# Patient Record
Sex: MALE | Race: WHITE | NOT HISPANIC OR LATINO | Employment: FULL TIME | URBAN - METROPOLITAN AREA
[De-identification: names, ages, dates, MRNs, and addresses within clinical notes are randomized per-mention and may not be internally consistent; named-entity substitution may affect disease eponyms.]

---

## 2024-01-23 ENCOUNTER — HOSPITAL ENCOUNTER (OUTPATIENT)
Dept: SLEEP MEDICINE | Facility: HOSPITAL | Age: 29
Discharge: HOME | End: 2024-01-23
Admitting: INTERNAL MEDICINE
Payer: COMMERCIAL

## 2024-01-23 DIAGNOSIS — G47.33 OSA (OBSTRUCTIVE SLEEP APNEA): ICD-10-CM

## 2024-01-23 PROCEDURE — 95806 SLEEP STUDY UNATT&RESP EFFT: CPT

## 2024-02-05 DIAGNOSIS — G47.33 OSA (OBSTRUCTIVE SLEEP APNEA): Primary | ICD-10-CM

## 2024-02-06 ENCOUNTER — TELEPHONE (OUTPATIENT)
Dept: SLEEP MEDICINE | Facility: HOSPITAL | Age: 29
End: 2024-02-06
Payer: COMMERCIAL

## 2024-02-06 NOTE — TELEPHONE ENCOUNTER
Lm with results on pts vm. Faxed order to [facility] for set up. Pt will need to schedule a f/u for compliance.

## 2024-02-07 ENCOUNTER — TELEPHONE (OUTPATIENT)
Dept: SLEEP MEDICINE | Facility: HOSPITAL | Age: 29
End: 2024-02-07
Payer: COMMERCIAL

## 2024-02-07 NOTE — TELEPHONE ENCOUNTER
Received a phone call from [id number]. Pt has not been seen within 6mo and will need an appt if he wants to get set up on cpap.

## 2024-02-29 ENCOUNTER — OFFICE VISIT (OUTPATIENT)
Dept: FAMILY MEDICINE CLINIC | Facility: CLINIC | Age: 29
End: 2024-02-29
Payer: COMMERCIAL

## 2024-02-29 VITALS
SYSTOLIC BLOOD PRESSURE: 132 MMHG | BODY MASS INDEX: 30.28 KG/M2 | RESPIRATION RATE: 14 BRPM | WEIGHT: 243.5 LBS | HEART RATE: 90 BPM | OXYGEN SATURATION: 100 % | TEMPERATURE: 97.5 F | DIASTOLIC BLOOD PRESSURE: 80 MMHG | HEIGHT: 75 IN

## 2024-02-29 DIAGNOSIS — R61 HYPERHIDROSIS: Primary | Chronic | ICD-10-CM

## 2024-02-29 DIAGNOSIS — G47.33 OSA (OBSTRUCTIVE SLEEP APNEA): Chronic | ICD-10-CM

## 2024-02-29 DIAGNOSIS — R74.8 ELEVATED LIVER ENZYMES: Chronic | ICD-10-CM

## 2024-02-29 DIAGNOSIS — Z00.00 ROUTINE GENERAL MEDICAL EXAMINATION AT A HEALTH CARE FACILITY: ICD-10-CM

## 2024-02-29 DIAGNOSIS — E78.2 MIXED HYPERLIPIDEMIA: Chronic | ICD-10-CM

## 2024-02-29 RX ORDER — OXYBUTYNIN CHLORIDE 10 MG/1
10 TABLET, EXTENDED RELEASE ORAL DAILY
Qty: 30 TABLET | Refills: 1 | Status: SHIPPED | OUTPATIENT
Start: 2024-02-29

## 2024-02-29 RX ORDER — OXYBUTYNIN CHLORIDE 5 MG/1
5 TABLET, EXTENDED RELEASE ORAL DAILY
Qty: 30 TABLET | Refills: 1 | Status: CANCELLED | OUTPATIENT
Start: 2024-02-29

## 2024-02-29 NOTE — PROGRESS NOTES
"Chief Complaint  Follow-up    Subjective        Edouard Schneider presents to [facility]  History of Present Illness patient here for follow-up on hyperhidrosis.  Seen last summer as new patient appointment.  He had lost insurance and was not able to continue his oxybutynin after first month but did find it helpful.  He would like to continue it but would like to see if he can go up to 10 mg.  He noted that he did not begin sweating as soon as he normally would but once he started sweating it was about the same amount.  He sweats so profusely that he soaks even his jackets.  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  He denies any side effects on oxybutynin.    He had his sleep study which shows very severe sleep apnea and will get CPAP upcoming.  When he saw the results of sleep study he is encouraged that CPAP should make a significant improvement in his health.    Since last visit when labs came back showing mildly elevated liver enzymes and hyperlipidemia he has really worked to improve his diet.  He has cut back on soft drinks and rarely drinks them and limits alcohol to 5 beers or so every couple weeks with friends.  He has cleaned up his diet.  For lunch has been eating chicken wraps, broccoli, grapes and has lost approximately 11 pounds since last visit.  He has new [employer] and been walking quite a bit with dog.  He has also joined gym with buddies.    He has started working as a  for [employer] where he went to school.  He really enjoys his job.    Objective   Vital Signs:  /80   Pulse 90   Temp 97.5 °F (36.4 °C) (Temporal)   Resp 14   Ht 189.2 cm (74.5\")   Wt 110 kg (243 lb 8 oz)   SpO2 100%   BMI 30.85 kg/m²   Estimated body mass index is 30.85 kg/m² as calculated from the following:    Height as of this encounter: 189.2 cm (74.5\").    Weight as of this encounter: 110 kg (243 lb 8 oz).               Physical Exam  Vitals and nursing " note reviewed.   Constitutional:       General: He is not in acute distress.     Appearance: He is well-developed. He is not ill-appearing or diaphoretic.   HENT:      Head: Normocephalic and atraumatic.      Right Ear: Ear canal and external ear normal. Tympanic membrane is scarred.      Left Ear: Ear canal and external ear normal. Tympanic membrane is scarred.      Mouth/Throat:      Mouth: Mucous membranes are moist.      Pharynx: No posterior oropharyngeal erythema.   Eyes:      General: No scleral icterus.        Right eye: No discharge.         Left eye: No discharge.      Conjunctiva/sclera: Conjunctivae normal.   Neck:      Thyroid: No thyromegaly.   Cardiovascular:      Rate and Rhythm: Normal rate and regular rhythm.      Heart sounds: Normal heart sounds.   Pulmonary:      Effort: Pulmonary effort is normal.      Breath sounds: Normal breath sounds.   Abdominal:      General: Bowel sounds are normal. There is no distension.      Palpations: Abdomen is soft. There is no hepatomegaly or mass.      Tenderness: There is no abdominal tenderness.   Musculoskeletal:         General: No deformity.      Cervical back: Neck supple.      Comments: Gait smooth and steady   Lymphadenopathy:      Cervical: No cervical adenopathy.   Skin:     General: Skin is warm and dry.   Neurological:      General: No focal deficit present.      Mental Status: He is alert and oriented to person, place, and time.   Psychiatric:         Mood and Affect: Mood normal.         Behavior: Behavior normal.         Thought Content: Thought content normal.      Comments: Very pleasant and conversant, very engaged in health maintenance        Result Review :                     Assessment and Plan     Diagnoses and all orders for this visit:    1. Hyperhidrosis (Primary)  -     oxybutynin XL (Ditropan XL) 10 MG 24 hr tablet; Take 1 tablet by mouth Daily.  Dispense: 30 tablet; Refill: 1    2. RICHMOND (obstructive sleep apnea)    3. Elevated liver  enzymes    4. Mixed hyperlipidemia    5. Routine general medical examination at a health care facility  -     CBC & Differential; Future  -     Comprehensive Metabolic Panel; Future  -     Hemoglobin A1c; Future  -     Lipid Panel With LDL / HDL Ratio; Future  -     TSH Rfx On Abnormal To Free T4; Future    Hyperhidrosis: Improved with 5 mg but not really controlled so we will increase to 10 mg of oxybutynin.  Again discussed side effects.  If tolerating well and helpful after 1 month he can let me know and I will give him 90-day prescriptions.    Reviewed sleep study and discussed with patient.  Has upcoming appointment with the sleep medicine for CPAP which I think will be very helpful for him.    We discussed previously elevated liver enzymes likely due to hyperlipidemia.  I have put labs in for physical which he will get at his convenience prior to physical.  I would expect that they should be improved with continued weight loss and dietary modifications.  If liver enzymes continue to be elevated we will get ultrasound for NAFLD.  Patient agreeable at this time.         Follow Up     No follow-ups on file.  Patient was given instructions and counseling regarding his condition or for health maintenance advice. Please see specific information pulled into the AVS if appropriate.         Answers submitted by the patient for this visit:  Primary Reason for Visit (Submitted on 2/22/2024)  What is the primary reason for your visit?: Other  Other (Submitted on 2/22/2024)  Please describe your symptoms.: Check-Up from July visit. Had to reschedule multiple times due to change in my Insurance.  Have you had these symptoms before?: No  How long have you been having these symptoms?: 1-4 days  Please list any medications you are currently taking for this condition.: None at the moment. Was on medicine for sweating issue but have not been on medication since August due to Insurance.  Please describe any probable cause for  these symptoms. : NA

## 2024-03-04 ENCOUNTER — OFFICE VISIT (OUTPATIENT)
Dept: SLEEP MEDICINE | Facility: HOSPITAL | Age: 29
End: 2024-03-04
Payer: COMMERCIAL

## 2024-03-04 VITALS
OXYGEN SATURATION: 97 % | SYSTOLIC BLOOD PRESSURE: 132 MMHG | DIASTOLIC BLOOD PRESSURE: 69 MMHG | HEART RATE: 84 BPM | WEIGHT: 241 LBS | BODY MASS INDEX: 29.97 KG/M2 | HEIGHT: 75 IN

## 2024-03-04 DIAGNOSIS — G47.33 OSA (OBSTRUCTIVE SLEEP APNEA): Primary | ICD-10-CM

## 2024-03-04 PROCEDURE — G0463 HOSPITAL OUTPT CLINIC VISIT: HCPCS

## 2024-03-04 NOTE — PROGRESS NOTES
[facility]                          Chief Complaint:   F/up RICHMOND    History of present illness:   Subjective     Sun  Patient is a 28 y.o. male patient with no PMH who complains about snoring, apnea, frequent awakening and restless sleep.  BMI = 32.     HST 1/24/24:   HANNA= 53.9 /h; Supine HANNA: 66.3= /h.     MAIRA= 54.4 events/h; Avery SpO2= 79% and hypoxic burden: 65.8 min.       Sleep schedule:  -Bedtime: 11 PM  -Sleep latency: not long  -Wake up time: 5 AM , does not feel refreshed  -Nocturnal awakening: 3-5 times because of changing position and taking his dog out.  No difficulties going back to sleep.  -Perceived sleep hours: 6-7 h    ESS: Total score: 6     REVIEW OF SYSTEMS:   HEENT: No nasal congestion or postnasal drip   CARDIOVASCULAR: No chest pain, chest pressure or chest discomfort. No palpitations or edema.   RESPIRATORY: No shortness of breath, cough or sputum.   GASTROINTESTINAL: No abdominal bloating or reflux   NEUROLOGICAL/PSYCHOATRY: No depression nor anxiety    Past Medical History:  Past Medical History:   Diagnosis Date    Asthma About 2008    Sports Induce Asthma    GERD (gastroesophageal reflux disease)     Infant until 4 years old    History of medical problems 2-3 weeks ago    Severe Sleep Apnea from taking at home test. Scheduled for March 4th for follow-up with doctor to get put on C-Pap   , No past surgical history on file.,   Social History     Socioeconomic History    Marital status:    Tobacco Use    Smoking status: Never     Passive exposure: Never    Smokeless tobacco: Never   Substance and Sexual Activity    Alcohol use: Not Currently     Alcohol/week: 1.0 - 2.0 standard drink of alcohol     Types: 1 - 2 Cans of beer per week     Comment: Social/Light Drinker. Rarely drink.    Drug use: Never    Sexual activity: Yes     Partners: Female     Birth control/protection: Birth control pill     E-cigarette/Vaping     E-cigarette/Vaping Substances  "    E-cigarette/Vaping Devices         , and Allergies:  Patient has no known allergies.    Medication Review:     Current Outpatient Medications:     oxybutynin XL (Ditropan XL) 10 MG 24 hr tablet, Take 1 tablet by mouth Daily., Disp: 30 tablet, Rfl: 1      Objective   Vital Signs:  Vitals:    03/04/24 1419   BP: 132/69   Pulse: 84   SpO2: 97%   Weight: 109 kg (241 lb)   Height: 189.2 cm (74.5\")     Body mass index is 30.53 kg/m².          Physical Exam:   General Appearance:    Alert, cooperative, in no acute distress   ENMT:  Nieves score 3. Mallampati score 3. No oral thrush. Tonsils grade 1. Narrow distance in between the posterior pharyngeal pillars (<25 %).    Neck:  Large. Trachea midline. No thyromegaly.   Lungs:     Clear to auscultation,respirations regular, even and  unlabored    Heart:    Regular rhythm and normal rate, normal S1 and S2, no Murmur.   Abdomen:     Obese.  Soft.  No tenderness.  No HSM    Neuro:   Conscious, alert, oriented x3. Appropriate mood and affect.    Extremities:   Moves all extremities well, no edema, no cyanosis, no Redness              Diagnostic data:      Lab Results   Component Value Date    HGBA1C 5.20 06/23/2023     Triglycerides   Date Value Ref Range Status   06/23/2023 251 (H) 0 - 150 mg/dL Final     HDL Cholesterol   Date Value Ref Range Status   06/23/2023 32 (L) 40 - 60 mg/dL Final     Hemoglobin   Date Value Ref Range Status   06/23/2023 16.9 13.0 - 17.7 g/dL Final     Total CO2   Date Value Ref Range Status   06/23/2023 29.0 22.0 - 29.0 mmol/L Final        Assessment   Severe RICHMOND  Obesity, BMI 30  GERD      PLAN:  Start CPAP. I explained the result of the sleep study.  Counseled for weight loss.  Encouraged to exercise regularly and cut down on carbohydrates.  Discussed that losing weight may decrease the severity of sleep apnea and obviate the need of CPAP therapy.                  This note was dictated utilizing Dragon dictation  "

## 2024-03-05 ENCOUNTER — TELEPHONE (OUTPATIENT)
Dept: SLEEP MEDICINE | Facility: HOSPITAL | Age: 29
End: 2024-03-05
Payer: COMMERCIAL

## 2024-05-09 DIAGNOSIS — R61 HYPERHIDROSIS: Chronic | ICD-10-CM

## 2024-05-09 RX ORDER — OXYBUTYNIN CHLORIDE 10 MG/1
10 TABLET, EXTENDED RELEASE ORAL DAILY
Qty: 30 TABLET | Refills: 1 | Status: SHIPPED | OUTPATIENT
Start: 2024-05-09

## 2024-06-17 ENCOUNTER — OFFICE VISIT (OUTPATIENT)
Dept: SLEEP MEDICINE | Facility: HOSPITAL | Age: 29
End: 2024-06-17
Payer: COMMERCIAL

## 2024-06-17 VITALS
OXYGEN SATURATION: 98 % | WEIGHT: 234 LBS | HEIGHT: 75 IN | SYSTOLIC BLOOD PRESSURE: 114 MMHG | BODY MASS INDEX: 29.09 KG/M2 | HEART RATE: 93 BPM | DIASTOLIC BLOOD PRESSURE: 70 MMHG

## 2024-06-17 PROCEDURE — G0463 HOSPITAL OUTPT CLINIC VISIT: HCPCS

## 2024-06-17 NOTE — PROGRESS NOTES
[facility]                          Chief Complaint:   F/up RICHMOND    History of present illness:   Subjective     Sun  Patient is a 29 y.o. male patient with no PMH who complains about snoring, apnea, frequent awakening and restless sleep.      HST 24:   HANNA= 53.9 /h; Supine HANNA: 66.3= /h.     MAIRA= 54.4 events/h; Avery SpO2= 79% and hypoxic burden: 65.8 min.       Sleep schedule:  -Bedtime: 10 PM  -Sleep latency: not long  -Wake up time: 6 AM , does not feel refreshed  -Nocturnal awakenin-2 times because of taking his dog out.  No difficulties going back to sleep.  -Perceived sleep hours: 8 h    ESS: Total score: 4     Obesity:  Doing more exercise (walking). Lost some weight since last year.     REVIEW OF SYSTEMS:   HEENT: No nasal congestion or postnasal drip   CARDIOVASCULAR: No chest pain, chest pressure or chest discomfort. No palpitations or edema.   RESPIRATORY: No shortness of breath, cough or sputum.   GASTROINTESTINAL: No abdominal bloating or reflux   NEUROLOGICAL/PSYCHOATRY: No depression nor anxiety    Past Medical History:  Past Medical History:   Diagnosis Date    Asthma About     Sports Induce Asthma    GERD (gastroesophageal reflux disease)     Infant until 4 years old    History of medical problems 2-3 weeks ago    Severe Sleep Apnea from taking at home test. Scheduled for  for follow-up with doctor to get put on C-Pap   , No past surgical history on file.,   Social History     Socioeconomic History    Marital status:    Tobacco Use    Smoking status: Never     Passive exposure: Never    Smokeless tobacco: Never   Substance and Sexual Activity    Alcohol use: Not Currently     Alcohol/week: 1.0 - 2.0 standard drink of alcohol     Types: 1 - 2 Cans of beer per week     Comment: Social/Light Drinker. Rarely drink.    Drug use: Never    Sexual activity: Yes     Partners: Female     Birth control/protection: Birth control pill  "    E-cigarette/Vaping     E-cigarette/Vaping Substances     E-cigarette/Vaping Devices         , and Allergies:  Patient has no known allergies.    Medication Review:     Current Outpatient Medications:     oxybutynin XL (DITROPAN-XL) 10 MG 24 hr tablet, TAKE 1 TABLET BY MOUTH EVERY DAY, Disp: 30 tablet, Rfl: 1      Objective   Vital Signs:  Vitals:    06/17/24 0813   BP: 114/70   Pulse: 93   SpO2: 98%   Weight: 106 kg (234 lb)   Height: 189.2 cm (74.5\")     Body mass index is 29.64 kg/m².          Physical Exam:   General Appearance:    Alert, cooperative, in no acute distress   ENMT:  Nieves score 3. Mallampati score 3. No oral thrush. Tonsils grade 1. Narrow distance in between the posterior pharyngeal pillars (<25 %).    Neck:  Large. Trachea midline. No thyromegaly.   Lungs:     Clear to auscultation,respirations regular, even and  unlabored    Heart:    Regular rhythm and normal rate, normal S1 and S2, no Murmur.   Abdomen:     Obese.  Soft.  No tenderness.  No HSM    Neuro:   Conscious, alert, oriented x3. Appropriate mood and affect.    Extremities:   Moves all extremities well, no edema, no cyanosis, no Redness              Diagnostic data:    Download over the last 30 days  Usage: 100%  Usage>4h= 100%  Average: 7 h 56 min  AHI= 1.6  Leak= 10  P 95%= 14.6  CPAP 5-15.     Lab Results   Component Value Date    HGBA1C 5.20 06/23/2023     Triglycerides   Date Value Ref Range Status   06/23/2023 251 (H) 0 - 150 mg/dL Final     HDL Cholesterol   Date Value Ref Range Status   06/23/2023 32 (L) 40 - 60 mg/dL Final     Hemoglobin   Date Value Ref Range Status   06/23/2023 16.9 13.0 - 17.7 g/dL Final     Total CO2   Date Value Ref Range Status   06/23/2023 29.0 22.0 - 29.0 mmol/L Final        Assessment   Severe RICHMOND  Obesity, BMI 30  GERD- improved      PLAN:  Discussed the result of the download.   Patient is compliant with therapy and clinically benefit from treatment.  Patient was encouraged to continue using " PAP.  Refill supplies.   Counseled for weight loss.  Encouraged to exercise regularly and cut down on carbohydrates.  Discussed that losing weight may decrease the severity of sleep apnea and obviate the need of CPAP therapy.  RTC 12 months.                   This note was dictated utilizing Dragon dictation

## 2024-07-05 DIAGNOSIS — R61 HYPERHIDROSIS: Chronic | ICD-10-CM

## 2024-07-05 NOTE — TELEPHONE ENCOUNTER
Rx Refill Note  Requested Prescriptions     Pending Prescriptions Disp Refills    oxybutynin XL (DITROPAN-XL) 10 MG 24 hr tablet [Pharmacy Med Name: OXYBUTYNIN CL ER 10 MG TABLET] 90 tablet 1     Sig: TAKE 1 TABLET BY MOUTH EVERY DAY      Last office visit with prescribing clinician: 2/29/2024   Last telemedicine visit with prescribing clinician: Visit date not found   Next office visit with prescribing clinician: Visit date not found                         Would you like a call back once the refill request has been completed: [] Yes [] No    If the office needs to give you a call back, can they leave a voicemail: [] Yes [] No    Nagi Ag Rep  07/05/24, 10:26 EDT

## 2024-07-08 RX ORDER — OXYBUTYNIN CHLORIDE 10 MG/1
10 TABLET, EXTENDED RELEASE ORAL DAILY
Qty: 90 TABLET | Refills: 1 | Status: SHIPPED | OUTPATIENT
Start: 2024-07-08

## 2025-01-23 DIAGNOSIS — R61 HYPERHIDROSIS: Chronic | ICD-10-CM

## 2025-01-23 NOTE — TELEPHONE ENCOUNTER
Name: Jennie Edouard      Relationship: Self      Best Callback Number: [phone]      HUB PROVIDED THE RELAY MESSAGE FROM THE OFFICE      PATIENT: SCHEDULED PER NOTE    ADDITIONAL INFORMATION:

## 2025-01-23 NOTE — TELEPHONE ENCOUNTER
Rx Refill Note  Requested Prescriptions     Pending Prescriptions Disp Refills    oxybutynin XL (DITROPAN-XL) 10 MG 24 hr tablet [Pharmacy Med Name: OXYBUTYNIN CL ER 10 MG TABLET] 90 tablet 1     Sig: TAKE 1 TABLET BY MOUTH EVERY DAY      Last office visit with prescribing clinician: 2/29/2024   Last telemedicine visit with prescribing clinician: Visit date not found   Next office visit with prescribing clinician: 1/30/2025                         Would you like a call back once the refill request has been completed: [] Yes [] No    If the office needs to give you a call back, can they leave a voicemail: [] Yes [] No    Nagi Ag Rep  01/23/25, 11:59 EST

## 2025-01-24 RX ORDER — OXYBUTYNIN CHLORIDE 10 MG/1
10 TABLET, EXTENDED RELEASE ORAL DAILY
Qty: 90 TABLET | Refills: 0 | Status: SHIPPED | OUTPATIENT
Start: 2025-01-24

## 2025-01-30 ENCOUNTER — OFFICE VISIT (OUTPATIENT)
Dept: FAMILY MEDICINE CLINIC | Facility: CLINIC | Age: 30
End: 2025-01-30
Payer: COMMERCIAL

## 2025-01-30 VITALS
OXYGEN SATURATION: 99 % | HEIGHT: 75 IN | WEIGHT: 238.6 LBS | HEART RATE: 82 BPM | BODY MASS INDEX: 29.67 KG/M2 | SYSTOLIC BLOOD PRESSURE: 124 MMHG | DIASTOLIC BLOOD PRESSURE: 88 MMHG | RESPIRATION RATE: 12 BRPM | TEMPERATURE: 97.5 F

## 2025-01-30 DIAGNOSIS — G47.33 OSA ON CPAP: Chronic | ICD-10-CM

## 2025-01-30 DIAGNOSIS — R61 HYPERHIDROSIS: Chronic | ICD-10-CM

## 2025-01-30 DIAGNOSIS — J45.20 MILD INTERMITTENT ASTHMA WITHOUT COMPLICATION: ICD-10-CM

## 2025-01-30 DIAGNOSIS — Z00.00 ROUTINE GENERAL MEDICAL EXAMINATION AT A HEALTH CARE FACILITY: Primary | ICD-10-CM

## 2025-01-30 PROCEDURE — 99395 PREV VISIT EST AGE 18-39: CPT | Performed by: NURSE PRACTITIONER

## 2025-01-30 RX ORDER — ALBUTEROL SULFATE 90 UG/1
2 INHALANT RESPIRATORY (INHALATION) EVERY 4 HOURS PRN
Qty: 18 G | Refills: 2 | Status: SHIPPED | OUTPATIENT
Start: 2025-01-30

## 2025-01-30 RX ORDER — OXYBUTYNIN CHLORIDE 10 MG/1
10 TABLET, EXTENDED RELEASE ORAL DAILY
Qty: 90 TABLET | Refills: 2 | Status: SHIPPED | OUTPATIENT
Start: 2025-01-30

## 2025-01-30 NOTE — PROGRESS NOTES
Chief Complaint  Excessive Sweating (Pt need refill on medication )    Subjective        Edouard Schneider presents to [facility]  History of Present Illness    History of Present Illness  The patient presents for physical and evaluation of sleep apnea, hyperhidrosis, sports-induced asthma, and poor posture.    He was diagnosed with sleep apnea last year and has been using a CPAP machine. He reports no unusual fatigue and feels well-rested overall, attributing any occasional tiredness to improper CPAP placement. He is not experiencing any shortness of breath or cough. He reports no chest pain or heart palpitations, except for a racing heart during exercise. He reports no throat issues, trouble swallowing, reflux, changes in stool characteristics, blood in stool, vomiting, diarrhea, constipation, urinary issues, blood in urine, unexpected loss of libido, erectile dysfunction, testicular pain or swelling, muscle issues, or skin issues. He regularly uses sunscreen when outdoors.    He has set a health goal to reduce his weight from 238 pounds to between 225 and 230 pounds by next year. He aims to achieve this through gradual and sustainable lifestyle changes, including fat loss, muscle gain through weightlifting, and conditioning exercises. He acknowledges that his posture is suboptimal and is seeking recommendations for improvement.    He has a history of sports-induced asthma but has not used an inhaler for several years due to decreased physical activity. He is considering resuming inhaler use as he plans to increase his activity level.    He reports that oxybutynin has been effective in managing his hyperhidrosis, although it has not completely eliminated sweating. He is requesting a refill of this medication.      Supplemental Information  He maintains regular dental check-ups.    MEDICATIONS  Current    IMMUNIZATIONS  He is up to date on his vaccines.       Objective   Vital Signs:  BP  "124/88   Pulse 82   Temp 97.5 °F (36.4 °C) (Infrared)   Resp 12   Ht 189.2 cm (74.5\")   Wt 108 kg (238 lb 9.6 oz)   SpO2 99%   BMI 30.22 kg/m²   Estimated body mass index is 30.22 kg/m² as calculated from the following:    Height as of this encounter: 189.2 cm (74.5\").    Weight as of this encounter: 108 kg (238 lb 9.6 oz).       BMI is >= 30 and <35. (Class 1 Obesity). The following options were offered after discussion;: Information on healthy weight added to patient's after visit summary.      Physical Exam  Vitals and nursing note reviewed.   Constitutional:       General: He is not in acute distress.     Appearance: He is well-developed. He is not ill-appearing.   HENT:      Head: Normocephalic and atraumatic.      Right Ear: Tympanic membrane, ear canal and external ear normal.      Left Ear: Tympanic membrane, ear canal and external ear normal.      Mouth/Throat:      Mouth: Mucous membranes are moist.      Pharynx: Uvula midline. No posterior oropharyngeal erythema.   Eyes:      General: No scleral icterus.        Right eye: No discharge.         Left eye: No discharge.      Conjunctiva/sclera: Conjunctivae normal.      Pupils: Pupils are equal, round, and reactive to light.   Neck:      Thyroid: No thyromegaly.   Cardiovascular:      Rate and Rhythm: Normal rate and regular rhythm.      Heart sounds: Normal heart sounds.   Pulmonary:      Effort: Pulmonary effort is normal.      Breath sounds: Normal breath sounds.   Abdominal:      General: Bowel sounds are normal.      Palpations: Abdomen is soft.      Tenderness: There is no abdominal tenderness.   Musculoskeletal:         General: No deformity.      Cervical back: Neck supple.      Comments: Gait smooth and steady   Lymphadenopathy:      Cervical: No cervical adenopathy.   Skin:     General: Skin is warm and dry.   Neurological:      General: No focal deficit present.      Mental Status: He is alert and oriented to person, place, and time. "   Psychiatric:         Mood and Affect: Mood normal.         Behavior: Behavior normal.         Thought Content: Thought content normal.          Physical Exam       Result Review :            Results                  Assessment and Plan     Diagnoses and all orders for this visit:    1. Routine general medical examination at a health care facility (Primary)  -     CBC (No Diff)  -     Comprehensive Metabolic Panel  -     Hemoglobin A1c  -     Lipid Panel With LDL / HDL Ratio  -     TSH Rfx On Abnormal To Free T4    2. Hyperhidrosis  -     oxybutynin XL (DITROPAN-XL) 10 MG 24 hr tablet; Take 1 tablet by mouth Daily.  Dispense: 90 tablet; Refill: 2    3. RICHMOND on CPAP    4. Mild intermittent asthma without complication  -     albuterol sulfate HFA (Ventolin HFA) 108 (90 Base) MCG/ACT inhaler; Inhale 2 puffs Every 4 (Four) Hours As Needed for Shortness of Air.  Dispense: 18 g; Refill: 2        Assessment & Plan  1. Sleep Apnea.  He has been using his CPAP machine consistently and reports feeling well-rested for the most part. Blood work has been ordered to monitor his condition.    2. Hyperhidrosis.  He reports significant improvement in sweating symptoms with oxybutynin. A prescription refill for oxybutynin has been provided and sent to preferred pharmacy    3. Sports-Induced Asthma.  He has a history of sports-induced asthma and has requested an inhaler due to increased physical activity. A prescription for albuterol has been issued and sent to preferred pharmacy    4. Poor Posture.  He has been advised to improve his posture through regular back stretching exercises. The use of a lumbar spine stretcher was recommended to aid in this process.    5. Health Maintenance.  He has been counseled on the importance of maintaining up-to-date vaccinations, particularly in light of the current influenza season.    Appropriate health maintenance and prevention topics specific for this patient were discussed today.  Additionally,  health goals, and health concerns addressed as appropriate.  Pt was encouraged to stay up to date on recommended screenings and vaccines based on USPSTF guidelines.               Follow Up     No follow-ups on file.  Patient was given instructions and counseling regarding his condition or for health maintenance advice. Please see specific information pulled into the AVS if appropriate.    Patient or patient representative verbalized consent for the use of Ambient Listening during the visit with  ABHINAV Schaefer for chart documentation. 2/10/2025  18:45 EST

## 2025-01-31 LAB
ALBUMIN SERPL-MCNC: 5 G/DL (ref 4.3–5.2)
ALP SERPL-CCNC: 77 IU/L (ref 44–121)
ALT SERPL-CCNC: 33 IU/L (ref 0–44)
AST SERPL-CCNC: 51 IU/L (ref 0–40)
BILIRUB SERPL-MCNC: 0.4 MG/DL (ref 0–1.2)
BUN SERPL-MCNC: 15 MG/DL (ref 6–20)
BUN/CREAT SERPL: 13 (ref 9–20)
CALCIUM SERPL-MCNC: 9.8 MG/DL (ref 8.7–10.2)
CHLORIDE SERPL-SCNC: 101 MMOL/L (ref 96–106)
CHOLEST SERPL-MCNC: 140 MG/DL (ref 100–199)
CO2 SERPL-SCNC: 23 MMOL/L (ref 20–29)
CREAT SERPL-MCNC: 1.18 MG/DL (ref 0.76–1.27)
EGFRCR SERPLBLD CKD-EPI 2021: 87 ML/MIN/1.73
ERYTHROCYTE [DISTWIDTH] IN BLOOD BY AUTOMATED COUNT: 12.2 % (ref 11.6–15.4)
GLOBULIN SER CALC-MCNC: 2.1 G/DL (ref 1.5–4.5)
GLUCOSE SERPL-MCNC: 80 MG/DL (ref 70–99)
HBA1C MFR BLD: 5.3 % (ref 4.8–5.6)
HCT VFR BLD AUTO: 45.6 % (ref 37.5–51)
HDLC SERPL-MCNC: 31 MG/DL
HGB BLD-MCNC: 15.3 G/DL (ref 13–17.7)
LDLC SERPL CALC-MCNC: 75 MG/DL (ref 0–99)
LDLC/HDLC SERPL: 2.4 RATIO (ref 0–3.6)
MCH RBC QN AUTO: 29.3 PG (ref 26.6–33)
MCHC RBC AUTO-ENTMCNC: 33.6 G/DL (ref 31.5–35.7)
MCV RBC AUTO: 87 FL (ref 79–97)
PLATELET # BLD AUTO: 326 X10E3/UL (ref 150–450)
POTASSIUM SERPL-SCNC: 4.9 MMOL/L (ref 3.5–5.2)
PROT SERPL-MCNC: 7.1 G/DL (ref 6–8.5)
RBC # BLD AUTO: 5.22 X10E6/UL (ref 4.14–5.8)
SODIUM SERPL-SCNC: 142 MMOL/L (ref 134–144)
TRIGL SERPL-MCNC: 200 MG/DL (ref 0–149)
TSH SERPL DL<=0.005 MIU/L-ACNC: 2.41 UIU/ML (ref 0.45–4.5)
VLDLC SERPL CALC-MCNC: 34 MG/DL (ref 5–40)
WBC # BLD AUTO: 7.2 X10E3/UL (ref 3.4–10.8)

## 2025-06-30 ENCOUNTER — OFFICE VISIT (OUTPATIENT)
Dept: SLEEP MEDICINE | Facility: HOSPITAL | Age: 30
End: 2025-06-30
Payer: COMMERCIAL

## 2025-06-30 VITALS
WEIGHT: 214 LBS | OXYGEN SATURATION: 98 % | SYSTOLIC BLOOD PRESSURE: 127 MMHG | HEIGHT: 75 IN | DIASTOLIC BLOOD PRESSURE: 85 MMHG | BODY MASS INDEX: 26.61 KG/M2 | HEART RATE: 80 BPM

## 2025-06-30 DIAGNOSIS — G47.33 OBSTRUCTIVE SLEEP APNEA, ADULT: Primary | ICD-10-CM

## 2025-06-30 PROCEDURE — G0463 HOSPITAL OUTPT CLINIC VISIT: HCPCS

## 2025-06-30 NOTE — PROGRESS NOTES
[facility]                          Chief Complaint:   F/up RICHMOND    History of present illness:   Subjective     Sun  Patient is a 30 y.o. male patient with no PMH who complains about snoring, apnea, frequent awakening and restless sleep.      HST 24:   HANNA= 53.9 /h; Supine HANNA: 66.3= /h.     MAIRA= 54.4 events/h; Avery SpO2= 79% and hypoxic burden: 65.8 min.       Sleep schedule:  -Bedtime: 10 PM  -Sleep latency: not long  -Wake up time: 6 AM , does not feel refreshed  -Nocturnal awakenin-2 times because of taking his dog out.  No difficulties going back to sleep.  -Perceived sleep hours: 8 h      ESS: Total score: 6     Obesity:  Doing more exercise (walking). Lost some weight since last year. Lost weight. B,I down from 30 to 27,     REVIEW OF SYSTEMS:   HEENT: No nasal congestion or postnasal drip   CARDIOVASCULAR: No chest pain, chest pressure or chest discomfort. No palpitations or edema.   RESPIRATORY: No shortness of breath, cough or sputum.   GASTROINTESTINAL: No abdominal bloating or reflux   NEUROLOGICAL/PSYCHIATRY: No depression nor anxiety    Past Medical History:  Past Medical History:   Diagnosis Date    Asthma About     Sports Induce Asthma    GERD (gastroesophageal reflux disease)     Infant until 4 years old    History of medical problems 2-3 weeks ago    Severe Sleep Apnea from taking at home test. Scheduled for  for follow-up with doctor to get put on C-Pap    Sleep apnea    , No past surgical history on file.,   Social History     Socioeconomic History    Marital status:    Tobacco Use    Smoking status: Never     Passive exposure: Never    Smokeless tobacco: Never   Vaping Use    Vaping status: Never Used   Substance and Sexual Activity    Alcohol use: Not Currently     Alcohol/week: 1.0 - 2.0 standard drink of alcohol     Types: 1 - 2 Cans of beer per week     Comment: Social/Light Drinker. Rarely drink.    Drug use: Never    Sexual  "activity: Yes     Partners: Female     Birth control/protection: None     Comment: Currently trying for kids, that’s why no protection.     E-cigarette/Vaping    E-cigarette/Vaping Use Never User      E-cigarette/Vaping Substances     E-cigarette/Vaping Devices         , and Allergies:  Patient has no known allergies.    Medication Review:     Current Outpatient Medications:     albuterol sulfate HFA (Ventolin HFA) 108 (90 Base) MCG/ACT inhaler, Inhale 2 puffs Every 4 (Four) Hours As Needed for Shortness of Air., Disp: 18 g, Rfl: 2    oxybutynin XL (DITROPAN-XL) 10 MG 24 hr tablet, Take 1 tablet by mouth Daily., Disp: 90 tablet, Rfl: 2      Objective   Vital Signs:  Vitals:    06/30/25 0843   BP: 127/85   Pulse: 80   SpO2: 98%   Weight: 97.1 kg (214 lb)   Height: 189.2 cm (74.5\")     Body mass index is 27.11 kg/m².          Physical Exam:   General Appearance:    Alert, cooperative, in no acute distress   ENMT:  Nieves score 3. Mallampati score 3. No oral thrush. Tonsils grade 1. Narrow distance in between the posterior pharyngeal pillars (<25 %).    Neck:  Large. Trachea midline. No thyromegaly.   Lungs:     Clear to auscultation,respirations regular, even and  unlabored    Heart:    Regular rhythm and normal rate, normal S1 and S2, no Murmur.   Abdomen:     Obese.  Soft.  No tenderness.  No HSM    Neuro:   Conscious, alert, oriented x3. Appropriate mood and affect.    Extremities:   Moves all extremities well, no edema, no cyanosis, no Redness              Diagnostic data:    Download over the last 30 days  Usage: 97%  Usage>4h= 83%  Average: 6 h 28 min  AHI= 1.4  Leak= 34  P 95%= 11  CPAP 5-15.     Lab Results   Component Value Date    HGBA1C 5.3 01/30/2025     Triglycerides   Date Value Ref Range Status   01/30/2025 200 (H) 0 - 149 mg/dL Final     HDL Cholesterol   Date Value Ref Range Status   01/30/2025 31 (L) >39 mg/dL Final     Hemoglobin   Date Value Ref Range Status   01/30/2025 15.3 13.0 - 17.7 g/dL " Final     Total CO2   Date Value Ref Range Status   01/30/2025 23 20 - 29 mmol/L Final        Assessment   Severe RICHMOND  Overweight, BMI 27  GERD- improved      PLAN:  Discussed the result of the download.   Patient is compliant with therapy and clinically benefit from treatment.  Patient was encouraged to continue using PAP.  Refill supplies. Air leak is due to his beard.   Counseled for weight loss.  Encouraged to exercise regularly and cut down on carbohydrates.  Discussed that losing weight may decrease the severity of sleep apnea and obviate the need of CPAP therapy.  PRN Albuterol. Lifestyle modifications for reflux..   RTC 12 months.                   This note was dictated utilizing Dragon dictation